# Patient Record
Sex: FEMALE | Race: WHITE | NOT HISPANIC OR LATINO | Employment: OTHER | URBAN - METROPOLITAN AREA
[De-identification: names, ages, dates, MRNs, and addresses within clinical notes are randomized per-mention and may not be internally consistent; named-entity substitution may affect disease eponyms.]

---

## 2017-02-15 ENCOUNTER — OFFICE VISIT (OUTPATIENT)
Dept: OBSTETRICS AND GYNECOLOGY | Facility: CLINIC | Age: 77
End: 2017-02-15

## 2017-02-15 VITALS
BODY MASS INDEX: 29.73 KG/M2 | HEIGHT: 66 IN | WEIGHT: 185 LBS | DIASTOLIC BLOOD PRESSURE: 80 MMHG | SYSTOLIC BLOOD PRESSURE: 140 MMHG

## 2017-02-15 DIAGNOSIS — Z01.419 GYNECOLOGIC EXAM NORMAL: Primary | ICD-10-CM

## 2017-02-15 PROCEDURE — G0101 CA SCREEN;PELVIC/BREAST EXAM: HCPCS | Performed by: NURSE PRACTITIONER

## 2017-02-15 RX ORDER — MONTELUKAST SODIUM 10 MG/1
10 TABLET ORAL
COMMUNITY
Start: 2016-06-08 | End: 2017-06-08

## 2017-02-15 RX ORDER — GABAPENTIN 100 MG/1
100 CAPSULE ORAL
COMMUNITY
Start: 2016-11-22 | End: 2017-11-22

## 2017-02-15 RX ORDER — METOPROLOL SUCCINATE 50 MG/1
50 TABLET, EXTENDED RELEASE ORAL
COMMUNITY
Start: 2016-10-25

## 2017-02-15 RX ORDER — MULTIVITAMIN
1 CAPSULE ORAL
COMMUNITY

## 2017-02-15 RX ORDER — CETIRIZINE HYDROCHLORIDE 5 MG/1
5 TABLET ORAL
COMMUNITY

## 2017-02-15 RX ORDER — OMEPRAZOLE 40 MG/1
40 CAPSULE, DELAYED RELEASE ORAL
COMMUNITY
Start: 2016-08-08 | End: 2017-08-08

## 2017-02-15 RX ORDER — TRIAMTERENE AND HYDROCHLOROTHIAZIDE 37.5; 25 MG/1; MG/1
1 TABLET ORAL
COMMUNITY
Start: 2016-08-08 | End: 2017-08-08

## 2017-02-15 NOTE — PATIENT INSTRUCTIONS
Pt. Counseled today on self breast examinations discussed. Colonoscopy recommended.  Bone Density Test recommended.  Calcium and Vitamin D requirements discussed.   Diet and exercise also counseled.

## 2017-02-15 NOTE — PROGRESS NOTES
Connie Mc is a 76 y.o. female.   Chief Complaint   Patient presents with   • Gynecologic Exam     annual      HPI:pt here for gyn exam, voices no c/o since last visit    The following portions of the patient's history were reviewed and updated as appropriate: allergies, current medications, past family history, past medical history, past social history, past surgical history and problem list.    Review of Systems  Review of Systems   Constitutional: Negative.  Negative for unexpected weight change.   Respiratory: Negative for chest tightness and shortness of breath.    Cardiovascular: Negative for chest pain and palpitations.   Gastrointestinal: Negative for abdominal pain and blood in stool.   Endocrine: Negative.    Genitourinary: Negative for dyspareunia, dysuria, frequency, hematuria, menstrual problem, pelvic pain, vaginal bleeding, vaginal discharge and vaginal pain.   Musculoskeletal: Negative for joint swelling.   Skin: Negative for color change, rash and wound.   Allergic/Immunologic: Negative.    Psychiatric/Behavioral: Negative.    All other systems reviewed and are negative.      Objective   Physical Exam   Constitutional: She is oriented to person, place, and time. She appears well-developed and well-nourished.   HENT:   Head: Normocephalic.   Neck: Normal range of motion.   Cardiovascular: Normal rate and regular rhythm.    Pulmonary/Chest: Effort normal and breath sounds normal. Right breast exhibits no mass and no nipple discharge. Left breast exhibits no mass and no nipple discharge. There is no breast swelling.   Breasts soft without palpable masses   Abdominal: Soft. Bowel sounds are normal.   Genitourinary: Vagina normal and uterus normal. There is no rash or lesion on the right labia. There is no rash or lesion on the left labia. Cervix exhibits no friability. Right adnexum displays no mass, no tenderness and no fullness. Left adnexum displays no mass, no tenderness and no fullness.    Genitourinary Comments: Ovaries  Within normal limits. Cervix  Within normal limits   Neurological: She is alert and oriented to person, place, and time.   Skin: Skin is warm and dry.   Psychiatric: She has a normal mood and affect. Her behavior is normal.   Vitals reviewed.      Assessment/Plan   Patient Instructions   Pt. Counseled today on self breast examinations discussed. Colonoscopy recommended.  Bone Density Test recommended.  Calcium and Vitamin D requirements discussed.   Diet and exercise also counseled.       Connie was seen today for gynecologic exam.    Diagnoses and all orders for this visit:    Gynecologic exam normal  -     Pap IG, Rfx HPV ASCU        Return in about 1 year (around 2/15/2018).

## 2017-02-17 LAB
CONV .: NORMAL
CYTOLOGIST CVX/VAG CYTO: NORMAL
CYTOLOGY CVX/VAG DOC THIN PREP: NORMAL
DX ICD CODE: NORMAL
HIV 1 & 2 AB SER-IMP: NORMAL
OTHER STN SPEC: NORMAL
PATH REPORT.FINAL DX SPEC: NORMAL
STAT OF ADQ CVX/VAG CYTO-IMP: NORMAL

## 2017-06-21 ENCOUNTER — APPOINTMENT (OUTPATIENT)
Dept: WOMENS IMAGING | Facility: HOSPITAL | Age: 77
End: 2017-06-21

## 2017-06-21 PROCEDURE — MDREVIEWSP: Performed by: RADIOLOGY

## 2017-06-21 PROCEDURE — G0202 SCR MAMMO BI INCL CAD: HCPCS | Performed by: RADIOLOGY

## 2017-06-21 PROCEDURE — 76641 ULTRASOUND BREAST COMPLETE: CPT | Performed by: RADIOLOGY

## 2017-06-21 PROCEDURE — 77063 BREAST TOMOSYNTHESIS BI: CPT | Performed by: RADIOLOGY

## 2017-07-10 DIAGNOSIS — N64.89 BREAST ASYMMETRY: ICD-10-CM

## 2018-01-03 ENCOUNTER — HOSPITAL ENCOUNTER (OUTPATIENT)
Dept: PHYSICAL THERAPY | Facility: HOSPITAL | Age: 78
Setting detail: RECURRING SERIES
Discharge: HOME | End: 2018-02-06
Attending: NURSE PRACTITIONER | Admitting: NURSE PRACTITIONER

## 2018-03-20 ENCOUNTER — ON CAMPUS - OUTPATIENT (AMBULATORY)
Dept: URBAN - METROPOLITAN AREA HOSPITAL 2 | Facility: HOSPITAL | Age: 78
End: 2018-03-20
Payer: COMMERCIAL

## 2018-03-20 ENCOUNTER — OFFICE (AMBULATORY)
Dept: URBAN - METROPOLITAN AREA PATHOLOGY 4 | Facility: PATHOLOGY | Age: 78
End: 2018-03-20
Payer: COMMERCIAL

## 2018-03-20 ENCOUNTER — HOSPITAL ENCOUNTER (OUTPATIENT)
Dept: OTHER | Facility: HOSPITAL | Age: 78
Setting detail: SPECIMEN
Discharge: HOME | End: 2018-03-20
Attending: INTERNAL MEDICINE | Admitting: INTERNAL MEDICINE

## 2018-03-20 VITALS
SYSTOLIC BLOOD PRESSURE: 129 MMHG | DIASTOLIC BLOOD PRESSURE: 74 MMHG | HEART RATE: 68 BPM | DIASTOLIC BLOOD PRESSURE: 76 MMHG | HEART RATE: 67 BPM | OXYGEN SATURATION: 97 % | OXYGEN SATURATION: 100 % | HEART RATE: 71 BPM | WEIGHT: 193 LBS | DIASTOLIC BLOOD PRESSURE: 65 MMHG | RESPIRATION RATE: 19 BRPM | DIASTOLIC BLOOD PRESSURE: 73 MMHG | HEART RATE: 73 BPM | DIASTOLIC BLOOD PRESSURE: 59 MMHG | SYSTOLIC BLOOD PRESSURE: 127 MMHG | SYSTOLIC BLOOD PRESSURE: 146 MMHG | HEART RATE: 65 BPM | DIASTOLIC BLOOD PRESSURE: 66 MMHG | HEART RATE: 66 BPM | SYSTOLIC BLOOD PRESSURE: 137 MMHG | SYSTOLIC BLOOD PRESSURE: 112 MMHG | TEMPERATURE: 98 F | DIASTOLIC BLOOD PRESSURE: 62 MMHG | HEART RATE: 64 BPM | SYSTOLIC BLOOD PRESSURE: 154 MMHG | DIASTOLIC BLOOD PRESSURE: 79 MMHG | SYSTOLIC BLOOD PRESSURE: 122 MMHG | DIASTOLIC BLOOD PRESSURE: 75 MMHG | SYSTOLIC BLOOD PRESSURE: 119 MMHG | HEART RATE: 57 BPM | RESPIRATION RATE: 16 BRPM | DIASTOLIC BLOOD PRESSURE: 46 MMHG | RESPIRATION RATE: 18 BRPM | HEIGHT: 66 IN | OXYGEN SATURATION: 99 %

## 2018-03-20 DIAGNOSIS — D12.3 BENIGN NEOPLASM OF TRANSVERSE COLON: ICD-10-CM

## 2018-03-20 DIAGNOSIS — D12.2 BENIGN NEOPLASM OF ASCENDING COLON: ICD-10-CM

## 2018-03-20 DIAGNOSIS — Z86.010 PERSONAL HISTORY OF COLONIC POLYPS: ICD-10-CM

## 2018-03-20 DIAGNOSIS — K57.30 DIVERTICULOSIS OF LARGE INTESTINE WITHOUT PERFORATION OR ABS: ICD-10-CM

## 2018-03-20 DIAGNOSIS — K22.70 BARRETT'S ESOPHAGUS WITHOUT DYSPLASIA: ICD-10-CM

## 2018-03-20 DIAGNOSIS — D12.5 BENIGN NEOPLASM OF SIGMOID COLON: ICD-10-CM

## 2018-03-20 DIAGNOSIS — K63.5 POLYP OF COLON: ICD-10-CM

## 2018-03-20 DIAGNOSIS — D12.0 BENIGN NEOPLASM OF CECUM: ICD-10-CM

## 2018-03-20 DIAGNOSIS — K21.0 GASTRO-ESOPHAGEAL REFLUX DISEASE WITH ESOPHAGITIS: ICD-10-CM

## 2018-03-20 LAB
GI HISTOLOGY: A. UNSPECIFIED: (no result)
GI HISTOLOGY: B. UNSPECIFIED: (no result)
GI HISTOLOGY: C. UNSPECIFIED: (no result)
GI HISTOLOGY: D. UNSPECIFIED: (no result)
GI HISTOLOGY: E. UNSPECIFIED: (no result)
GI HISTOLOGY: PDF REPORT: (no result)

## 2018-03-20 PROCEDURE — 43239 EGD BIOPSY SINGLE/MULTIPLE: CPT | Mod: 59 | Performed by: INTERNAL MEDICINE

## 2018-03-20 PROCEDURE — 45385 COLONOSCOPY W/LESION REMOVAL: CPT | Mod: PT | Performed by: INTERNAL MEDICINE

## 2018-03-20 PROCEDURE — 88305 TISSUE EXAM BY PATHOLOGIST: CPT | Mod: 26 | Performed by: INTERNAL MEDICINE

## 2018-03-20 RX ADMIN — PROPOFOL: 10 INJECTION, EMULSION INTRAVENOUS at 10:08

## 2018-06-26 ENCOUNTER — APPOINTMENT (OUTPATIENT)
Dept: WOMENS IMAGING | Facility: HOSPITAL | Age: 78
End: 2018-06-26

## 2018-06-26 PROCEDURE — MDREVIEWSP: Performed by: RADIOLOGY

## 2018-06-26 PROCEDURE — 77067 SCR MAMMO BI INCL CAD: CPT | Performed by: RADIOLOGY

## 2018-06-26 PROCEDURE — 77063 BREAST TOMOSYNTHESIS BI: CPT | Performed by: RADIOLOGY

## 2019-06-17 ENCOUNTER — TRANSCRIBE ORDERS (OUTPATIENT)
Dept: CARDIOLOGY | Facility: HOSPITAL | Age: 79
End: 2019-06-17

## 2019-06-17 DIAGNOSIS — I49.3 PVC'S (PREMATURE VENTRICULAR CONTRACTIONS): ICD-10-CM

## 2019-06-17 DIAGNOSIS — R00.2 PALPITATIONS: Primary | ICD-10-CM

## 2019-06-17 DIAGNOSIS — I10 ESSENTIAL HYPERTENSION: ICD-10-CM

## 2019-06-18 ENCOUNTER — HOSPITAL ENCOUNTER (OUTPATIENT)
Dept: CARDIOLOGY | Facility: HOSPITAL | Age: 79
Discharge: HOME | End: 2019-06-18

## 2019-06-18 ENCOUNTER — TRANSCRIBE ORDERS (OUTPATIENT)
Dept: PHYSICAL THERAPY | Facility: CLINIC | Age: 79
End: 2019-06-18

## 2019-06-18 ENCOUNTER — HOSPITAL ENCOUNTER (OUTPATIENT)
Dept: CARDIOLOGY | Facility: HOSPITAL | Age: 79
Discharge: HOME | End: 2019-06-18
Admitting: NURSE PRACTITIONER

## 2019-06-18 DIAGNOSIS — R00.2 PALPITATIONS: ICD-10-CM

## 2019-06-18 DIAGNOSIS — I10 ESSENTIAL HYPERTENSION: ICD-10-CM

## 2019-06-18 DIAGNOSIS — R42 DIZZINESS AND GIDDINESS: Primary | ICD-10-CM

## 2019-06-18 DIAGNOSIS — I49.3 PVC'S (PREMATURE VENTRICULAR CONTRACTIONS): ICD-10-CM

## 2019-06-18 PROCEDURE — 93306 TTE W/DOPPLER COMPLETE: CPT

## 2019-06-18 PROCEDURE — 93225 XTRNL ECG REC<48 HRS REC: CPT

## 2019-06-19 VITALS
SYSTOLIC BLOOD PRESSURE: 113 MMHG | HEIGHT: 66 IN | WEIGHT: 176 LBS | BODY MASS INDEX: 28.28 KG/M2 | DIASTOLIC BLOOD PRESSURE: 62 MMHG

## 2019-06-25 PROCEDURE — 93227 XTRNL ECG REC<48 HR R&I: CPT | Performed by: INTERNAL MEDICINE

## 2019-06-27 LAB
BH CV ECHO MEAS - ACS: 1.9 CM
BH CV ECHO MEAS - AO MAX PG (FULL): 3.3 MMHG
BH CV ECHO MEAS - AO MAX PG: 6.9 MMHG
BH CV ECHO MEAS - AO MEAN PG (FULL): 1.5 MMHG
BH CV ECHO MEAS - AO MEAN PG: 3.5 MMHG
BH CV ECHO MEAS - AO ROOT AREA (BSA CORRECTED): 1.7
BH CV ECHO MEAS - AO ROOT AREA: 7.9 CM^2
BH CV ECHO MEAS - AO ROOT DIAM: 3.2 CM
BH CV ECHO MEAS - AO V2 MAX: 131.6 CM/SEC
BH CV ECHO MEAS - AO V2 MEAN: 89.9 CM/SEC
BH CV ECHO MEAS - AO V2 VTI: 31.3 CM
BH CV ECHO MEAS - ASC AORTA: 3.6 CM
BH CV ECHO MEAS - AVA(I,A): 2.6 CM^2
BH CV ECHO MEAS - AVA(I,D): 2.6 CM^2
BH CV ECHO MEAS - AVA(V,A): 2.3 CM^2
BH CV ECHO MEAS - AVA(V,D): 2.3 CM^2
BH CV ECHO MEAS - BSA(HAYCOCK): 1.9 M^2
BH CV ECHO MEAS - BSA: 1.9 M^2
BH CV ECHO MEAS - BZI_BMI: 28.4 KILOGRAMS/M^2
BH CV ECHO MEAS - BZI_METRIC_HEIGHT: 167.6 CM
BH CV ECHO MEAS - BZI_METRIC_WEIGHT: 79.8 KG
BH CV ECHO MEAS - EDV(CUBED): 107.7 ML
BH CV ECHO MEAS - EDV(MOD-SP4): 77.2 ML
BH CV ECHO MEAS - EDV(TEICH): 105.3 ML
BH CV ECHO MEAS - EF(CUBED): 78.2 %
BH CV ECHO MEAS - EF(MOD-SP4): 60.5 %
BH CV ECHO MEAS - EF(TEICH): 70.3 %
BH CV ECHO MEAS - ESV(CUBED): 23.5 ML
BH CV ECHO MEAS - ESV(MOD-SP4): 30.5 ML
BH CV ECHO MEAS - ESV(TEICH): 31.2 ML
BH CV ECHO MEAS - FS: 39.8 %
BH CV ECHO MEAS - IVS/LVPW: 1.2
BH CV ECHO MEAS - IVSD: 1 CM
BH CV ECHO MEAS - LA DIMENSION: 4.4 CM
BH CV ECHO MEAS - LA/AO: 1.4
BH CV ECHO MEAS - LV DIASTOLIC VOL/BSA (35-75): 40.7 ML/M^2
BH CV ECHO MEAS - LV IVRT: 0.07 SEC
BH CV ECHO MEAS - LV MASS(C)D: 155.8 GRAMS
BH CV ECHO MEAS - LV MASS(C)DI: 82.2 GRAMS/M^2
BH CV ECHO MEAS - LV MAX PG: 3.7 MMHG
BH CV ECHO MEAS - LV MEAN PG: 2.1 MMHG
BH CV ECHO MEAS - LV SYSTOLIC VOL/BSA (12-30): 16.1 ML/M^2
BH CV ECHO MEAS - LV V1 MAX: 95.6 CM/SEC
BH CV ECHO MEAS - LV V1 MEAN: 68.5 CM/SEC
BH CV ECHO MEAS - LV V1 VTI: 25 CM
BH CV ECHO MEAS - LVIDD: 4.8 CM
BH CV ECHO MEAS - LVIDS: 2.9 CM
BH CV ECHO MEAS - LVOT AREA: 3.2 CM^2
BH CV ECHO MEAS - LVOT DIAM: 2 CM
BH CV ECHO MEAS - LVPWD: 0.86 CM
BH CV ECHO MEAS - MV A MAX VEL: 82.4 CM/SEC
BH CV ECHO MEAS - MV DEC SLOPE: 424.7 CM/SEC^2
BH CV ECHO MEAS - MV DEC TIME: 0.2 SEC
BH CV ECHO MEAS - MV E MAX VEL: 86.3 CM/SEC
BH CV ECHO MEAS - MV E/A: 1
BH CV ECHO MEAS - PA ACC TIME: 0.18 SEC
BH CV ECHO MEAS - PA MAX PG (FULL): 0.42 MMHG
BH CV ECHO MEAS - PA MAX PG: 2.5 MMHG
BH CV ECHO MEAS - PA MEAN PG (FULL): 0.41 MMHG
BH CV ECHO MEAS - PA MEAN PG: 1.7 MMHG
BH CV ECHO MEAS - PA PR(ACCEL): -3.3 MMHG
BH CV ECHO MEAS - PA V2 MAX: 78.8 CM/SEC
BH CV ECHO MEAS - PA V2 MEAN: 63.4 CM/SEC
BH CV ECHO MEAS - PA V2 VTI: 21.7 CM
BH CV ECHO MEAS - PULM A REVS DUR: 0.14 SEC
BH CV ECHO MEAS - PULM A REVS VEL: 25.1 CM/SEC
BH CV ECHO MEAS - PULM DIAS VEL: 40 CM/SEC
BH CV ECHO MEAS - PULM S/D: 1.6
BH CV ECHO MEAS - PULM SYS VEL: 63.6 CM/SEC
BH CV ECHO MEAS - RAP SYSTOLE: 8 MMHG
BH CV ECHO MEAS - RV MAX PG: 2.1 MMHG
BH CV ECHO MEAS - RV MEAN PG: 1.3 MMHG
BH CV ECHO MEAS - RV V1 MAX: 71.9 CM/SEC
BH CV ECHO MEAS - RV V1 MEAN: 54.3 CM/SEC
BH CV ECHO MEAS - RV V1 VTI: 20.5 CM
BH CV ECHO MEAS - RVSP: 31.5 MMHG
BH CV ECHO MEAS - SI(AO): 130.3 ML/M^2
BH CV ECHO MEAS - SI(CUBED): 44.4 ML/M^2
BH CV ECHO MEAS - SI(LVOT): 42.8 ML/M^2
BH CV ECHO MEAS - SI(MOD-SP4): 24.7 ML/M^2
BH CV ECHO MEAS - SI(TEICH): 39.1 ML/M^2
BH CV ECHO MEAS - SV(AO): 246.9 ML
BH CV ECHO MEAS - SV(CUBED): 84.2 ML
BH CV ECHO MEAS - SV(LVOT): 81 ML
BH CV ECHO MEAS - SV(MOD-SP4): 46.7 ML
BH CV ECHO MEAS - SV(TEICH): 74.1 ML
BH CV ECHO MEAS - TR MAX VEL: 241.7 CM/SEC
MAXIMAL PREDICTED HEART RATE: 140 BPM
STRESS TARGET HR: 119 BPM

## 2019-06-27 PROCEDURE — 93306 TTE W/DOPPLER COMPLETE: CPT | Performed by: INTERNAL MEDICINE

## 2019-07-02 ENCOUNTER — APPOINTMENT (OUTPATIENT)
Dept: WOMENS IMAGING | Facility: HOSPITAL | Age: 79
End: 2019-07-02

## 2019-07-02 PROCEDURE — G0279 TOMOSYNTHESIS, MAMMO: HCPCS | Performed by: RADIOLOGY

## 2019-07-02 PROCEDURE — 77066 DX MAMMO INCL CAD BI: CPT | Performed by: RADIOLOGY

## 2019-07-02 PROCEDURE — MDREVIEWSP: Performed by: RADIOLOGY

## 2019-07-02 PROCEDURE — 76641 ULTRASOUND BREAST COMPLETE: CPT | Performed by: RADIOLOGY

## 2019-09-04 ENCOUNTER — TREATMENT (OUTPATIENT)
Dept: PHYSICAL THERAPY | Facility: CLINIC | Age: 79
End: 2019-09-04

## 2019-09-04 DIAGNOSIS — H81.10 BPPV (BENIGN PAROXYSMAL POSITIONAL VERTIGO), UNSPECIFIED LATERALITY: Primary | ICD-10-CM

## 2019-09-04 PROCEDURE — 97112 NEUROMUSCULAR REEDUCATION: CPT | Performed by: PHYSICAL THERAPIST

## 2019-09-04 PROCEDURE — 95992 CANALITH REPOSITIONING PROC: CPT | Performed by: PHYSICAL THERAPIST

## 2019-09-04 PROCEDURE — 97161 PT EVAL LOW COMPLEX 20 MIN: CPT | Performed by: PHYSICAL THERAPIST

## 2019-09-04 NOTE — PROGRESS NOTES
"Physical Therapy Initial Evaluation and Plan of Care    Patient: Connie Mc   : 1940  Diagnosis/ICD-10 Code:  BPPV (benign paroxysmal positional vertigo), unspecified laterality [H81.10]  Referring practitioner: No ref. provider found  Date of Initial Visit: 2019  Today's Date: 2019  Patient seen for 1 sessions           Subjective Questionnaire: DHI:       Subjective   Mrs. Mc states that the vertigo started last Thursday when turning in bed. This is an extension of a vertigo evaluation and plan of care completed on 19.   She states that the symptoms have progressively worsened regarding frequency and intensity over the last day.  Denies any emesis, headache, or auditory changes.   Exacerbating factors:  Rolling in bed, supine<>sit and bending forward.  Relieving factors:  Closing eyes and being still  Concurrent complaints:  Feeling \"funny\" and poor concentration.     Objective   Vertebral Artery Screen:  Negative  Sharp-Vlad Test:  Negative  Huong-Hallpike Rt: latency approx 35 sec;  Duration = 53 sec; Rebound = 33 sec.  VOR,  Thrust,  Smooth pursuit, gaze-evoked nystagmus and cervical ROM tests:  Negative.     Assessment & Plan     Assessment  Impairments: abnormal coordination and impaired balance  Assessment details: Mrs. Mc has returned to PT due to another onset of vertigo.  She was seen on 19 with such c/os; pnt at that time required only 1 session.  Today, a reassessment was completed with evidence suggesting a right canalithiesis BPPV.   Connie would benefit from outpatient PT in order to address and alleviate the symptoms associated with this occurrence of BPPV.  The patient's condition and/or services required are at a level of complexity that necessitates the skill or supervision of a PT.    Prognosis: good  Functional Limitations: moving in bed  Goals  Plan Goals: ST visits      1)  pnt will tolerate position changes w/o c/os of vertigo in order to " improve safety x's 50% of the time.    LTG:  DC      1)  Pnt to return to pre-onset level      2)  Improve balance     Plan  Therapy options: will be seen for skilled physical therapy services  Planned therapy interventions: balance/weight-bearing training, home exercise program, manual therapy and neuromuscular re-education  Other planned therapy interventions: canalith reposition maneuvers  Duration in visits: 12  Treatment plan discussed with: patient  Plan details: Test TUG and Sullivan balance.         Timed:         Manual Therapy:         mins  97963;     Therapeutic Exercise:         mins  03838;     Neuromuscular Bridget:  10      mins  21308;    Therapeutic Activity:          mins  85846;     Gait Training:           mins  61504;     Ultrasound:          mins  98984;    Ionto                                   mins   16622  Self Care                            mins   35475  Canalith Repos    19     mins 68322      Un-Timed:  Electrical Stimulation:         mins  49742 ( );  Dry Needling          mins self-pay  Traction          mins 16445  Low Eval    22      Mins  26922  Mod Eval         Mins  28430  High Eval                            Mins  54092  Re-Eval                               mins  73451        Timed Treatment:   29   mins   Total Treatment:     51   mins    PT SIGNATURE: Carmita Berry, BLAYNE   DATE TREATMENT INITIATED: 9/4/2019    90 Day Recertification  Certification Period: 12/3/2019  I certify that the therapy services are furnished while this patient is under my care.  The services outlined above are required by this patient, and will be reviewed every 90 days.     PHYSICIAN:       DATE:     Please sign and return via fax to  .. Thank you, [facility].

## 2019-09-05 ENCOUNTER — OFFICE VISIT (OUTPATIENT)
Dept: PHYSICAL THERAPY | Facility: CLINIC | Age: 79
End: 2019-09-05

## 2019-09-05 ENCOUNTER — TELEPHONE (OUTPATIENT)
Dept: ENDOCRINOLOGY | Facility: CLINIC | Age: 79
End: 2019-09-05

## 2019-09-05 DIAGNOSIS — H81.10 BPPV (BENIGN PAROXYSMAL POSITIONAL VERTIGO), UNSPECIFIED LATERALITY: Primary | ICD-10-CM

## 2019-09-05 PROCEDURE — 97112 NEUROMUSCULAR REEDUCATION: CPT | Performed by: PHYSICAL THERAPIST

## 2019-09-05 PROCEDURE — 97140 MANUAL THERAPY 1/> REGIONS: CPT | Performed by: PHYSICAL THERAPIST

## 2019-09-05 NOTE — TELEPHONE ENCOUNTER
PATIENT CALLED AND LM  FOR ME TO SEE  IF WE RECEIVED HER REFERRAL AND WE DID. I CALLED THE PATIENT BACK AND LM FOR THE PATIENT TO CALL ME BACK AND SCHEDULE AN NP REFERRAL AT  THE [facility].

## 2019-09-05 NOTE — PROGRESS NOTES
"Physical Therapy Daily Progress Note    VISIT#: 2    Subjective   Connie Mc reports that she was really nauseated x's 1 hr post the 1st session.  After such time, she was symptom free.   No \"occurrences\" but \"still don't feel right\"      Objective   Tug score:  12 sec  No symptoms with initial positional test for R [place]-Hallpike.   (+) left [place] and right horizontal canals.  Significant nystagmus noted today.  Initial reaction time was sudden and 1'52\" L Huong-Hallpike; with almost the same for rebound time.   R horizontal canal was immediate with reaction time of 33 sec and less  10 seconds for rebound.  No nausea noted today.   VOR exercises    See Exercise, Manual, and Modality Logs for complete treatment.     Patient Education: continue with positioning for sleep at home as previously discussed.     Assessment/Plan  Pnt still experiencing significant vertigo today; however with L [place]-Hallpike and roll tests.     Progress per Plan of Care            Timed:         Manual Therapy:         mins  34822;     Therapeutic Exercise:         mins  02191;     Neuromuscular Bridget:    10    mins  42414;    Therapeutic Activity:          mins  04930;     Gait Training:           mins  92982;     Ultrasound:          mins  96976;    Ionto                                   mins   18548  Self Care                            mins   01205  Canalith Repos               27    mins  27187    Un-Timed:  Electrical Stimulation:         mins  38511 ( );  Dry Needling          mins self-pay  Traction          mins 29058  Low Eval          Mins  32608  Mod Eval          Mins  92668  High Eval                            Mins  44406  Re-Eval                               mins  38084    Timed Treatment:   37   mins   Total Treatment:     46   mins    Carmita Berry PT    Physical Therapist  "

## 2019-09-06 ENCOUNTER — OFFICE VISIT (OUTPATIENT)
Dept: PHYSICAL THERAPY | Facility: CLINIC | Age: 79
End: 2019-09-06

## 2019-09-06 DIAGNOSIS — H81.10 BPPV (BENIGN PAROXYSMAL POSITIONAL VERTIGO), UNSPECIFIED LATERALITY: Primary | ICD-10-CM

## 2019-09-06 PROCEDURE — 97535 SELF CARE MNGMENT TRAINING: CPT | Performed by: PHYSICAL THERAPIST

## 2019-09-06 PROCEDURE — 95992 CANALITH REPOSITIONING PROC: CPT | Performed by: PHYSICAL THERAPIST

## 2019-09-06 NOTE — PROGRESS NOTES
"Physical Therapy Daily Progress Note    VISIT#: 3    Subjective   Connie Mc reports she has not had any further episodes since therapy session.  However, still \"feel a little off\"      Objective   (-) roll test today.   (+) L Huong-Hallpike with a 33 sec occurrence, 42 sec rebound x's 1st maneuver.  Second maneuver was negative.   See Exercise, Manual, and Modality Logs for complete treatment.     Patient Education:  Positioning reviewed     Assessment/Plan  Pnt with much less symptoms today and symptom free at end of session.       Anticipate DC next Visit            Timed:         Manual Therapy:         mins  49252;     Therapeutic Exercise:         mins  64393;     Neuromuscular Bridget:        mins  31476;    Therapeutic Activity:          mins  80929;     Gait Training:           mins  96858;     Ultrasound:          mins  23889;    Ionto                                   mins   61258  Self Care                     10       mins   82385  Canalith Repos               25    mins  82426    Un-Timed:  Electrical Stimulation:         mins  21127 ( );  Dry Needling          mins self-pay  Traction          mins 46049  Low Eval          Mins  05552  Mod Eval          Mins  46861  High Eval                            Mins  39318  Re-Eval                               mins  46818    Timed Treatment:   35   mins   Total Treatment:     42   mins    Carmita Berry, PT    Physical Therapist  "

## 2019-09-12 ENCOUNTER — OFFICE VISIT (OUTPATIENT)
Dept: PHYSICAL THERAPY | Facility: CLINIC | Age: 79
End: 2019-09-12

## 2019-09-12 DIAGNOSIS — H81.10 BPPV (BENIGN PAROXYSMAL POSITIONAL VERTIGO), UNSPECIFIED LATERALITY: Primary | ICD-10-CM

## 2019-09-12 PROCEDURE — 97535 SELF CARE MNGMENT TRAINING: CPT | Performed by: PHYSICAL THERAPIST

## 2019-09-12 PROCEDURE — 95992 CANALITH REPOSITIONING PROC: CPT | Performed by: PHYSICAL THERAPIST

## 2019-09-12 NOTE — PROGRESS NOTES
Physical Therapy Daily Progress Note    VISIT#: 4    Subjective   Connie Mc reports: Was doing ok til yesterday, this morning was very dizzy as she laid her head back on her bed.       Objective     See Exercise, Manual, and Modality Logs for complete treatment.   Epley x3 (R), rest between     Patient Education: keep head elevated when sleeping for 72 hours, no jerking movements of head     Assessment/Plan  Pt with continued nystagmus during Epley maneuvers today, educated on anterior/posterior/horizontal canals and relationship with each other along with calcifications. Pt with good understanding. Will perform roll test for horizontal canal tomorrow if pt's symptoms are still present.     Progress per Plan of Care and Progress strengthening /stabilization /functional activity            Timed:         Manual Therapy:         mins  77711;     Therapeutic Exercise:         mins  32043;     Neuromuscular Bridget:        mins  04303;    Therapeutic Activity:          mins  09976;     Gait Training:           mins  02753;     Ultrasound:          mins  93089;    Ionto                                   mins   29370  Self Care                       10     mins   95444  Canalith Repos               16    mins  93588    Un-Timed:  Electrical Stimulation:         mins  56782 ( );  Traction          mins 67194  Low Eval          Mins  06428  Mod Eval          Mins  57961  High Eval                            Mins  82992  Re-Eval                               mins  99141    Timed Treatment:   26   mins   Total Treatment:     32   mins    Yeimy Green, PT    Physical Therapist

## 2019-09-13 ENCOUNTER — OFFICE VISIT (OUTPATIENT)
Dept: PHYSICAL THERAPY | Facility: CLINIC | Age: 79
End: 2019-09-13

## 2019-09-13 DIAGNOSIS — H81.10 BPPV (BENIGN PAROXYSMAL POSITIONAL VERTIGO), UNSPECIFIED LATERALITY: Primary | ICD-10-CM

## 2019-09-13 PROCEDURE — 95992 CANALITH REPOSITIONING PROC: CPT | Performed by: PHYSICAL THERAPIST

## 2019-09-13 NOTE — PROGRESS NOTES
Physical Therapy Daily Progress Note    VISIT#: 5    Subjective   Connie Mc reports: Had a small episode as she was riding home after yesterday's session, today is not too bad, still feels unsteady on her feet.       Objective     See Exercise, Manual, and Modality Logs for complete treatment.   Roll test x1 for horizontal canal  Epley (R) x3     Assessment/Plan  Pt with continued dizziness and nustagmus, attempted roll test to clear horizontal canal, increased sx's in AP canal following. Performed Epley x3, no nystagmus in last 2 positions on last attempt.     Progress per Plan of Care            Timed:         Manual Therapy:         mins  78743;     Therapeutic Exercise:         mins  08618;     Neuromuscular Bridget:        mins  79986;    Therapeutic Activity:          mins  06846;     Gait Training:           mins  32763;     Ultrasound:          mins  00363;    Ionto                                   mins   37353  Self Care                            mins   58720  Canalith Repos               20    mins  50413    Un-Timed:  Electrical Stimulation:         mins  64974 ( );  Traction          mins 63243  Low Eval          Mins  63377  Mod Eval          Mins  96691  High Eval                            Mins  91717  Re-Eval                               mins  32668    Timed Treatment:   20   mins   Total Treatment:     25   mins    Yeimy Green, PT    Physical Therapist

## 2019-12-17 PROBLEM — F41.9 ANXIETY: Status: ACTIVE | Noted: 2018-08-15

## 2019-12-17 PROBLEM — M54.10 BACK PAIN WITH LEFT-SIDED RADICULOPATHY: Status: ACTIVE | Noted: 2018-03-13

## 2019-12-17 PROBLEM — R73.03 PREDIABETES: Status: ACTIVE | Noted: 2018-07-16

## 2019-12-17 RX ORDER — CLOBETASOL PROPIONATE 0.5 MG/G
CREAM TOPICAL
COMMUNITY
Start: 2018-10-31

## 2019-12-17 RX ORDER — AMLODIPINE BESYLATE 5 MG/1
5 TABLET ORAL DAILY
COMMUNITY
Start: 2019-01-14

## 2019-12-17 RX ORDER — TIZANIDINE 2 MG/1
2 TABLET ORAL
COMMUNITY
Start: 2019-01-14 | End: 2020-01-14

## 2019-12-17 RX ORDER — FLUTICASONE PROPIONATE 50 MCG
1 SPRAY, SUSPENSION (ML) NASAL DAILY
COMMUNITY

## 2019-12-17 RX ORDER — ESCITALOPRAM OXALATE 10 MG/1
10 TABLET ORAL DAILY
COMMUNITY
Start: 2019-01-14

## 2019-12-17 RX ORDER — LOSARTAN POTASSIUM 50 MG/1
50 TABLET ORAL DAILY
COMMUNITY
Start: 2019-01-14

## 2019-12-17 RX ORDER — TRIAMTERENE AND HYDROCHLOROTHIAZIDE 37.5; 25 MG/1; MG/1
1 TABLET ORAL DAILY
COMMUNITY
Start: 2019-01-14

## 2019-12-17 RX ORDER — OMEPRAZOLE 40 MG/1
40 CAPSULE, DELAYED RELEASE ORAL DAILY
COMMUNITY
Start: 2019-01-14

## 2019-12-17 RX ORDER — CELECOXIB 200 MG/1
200 CAPSULE ORAL DAILY
COMMUNITY
Start: 2019-06-28

## 2019-12-17 RX ORDER — ROSUVASTATIN CALCIUM 10 MG/1
10 TABLET, COATED ORAL DAILY
COMMUNITY
Start: 2019-01-14 | End: 2020-01-14

## 2020-01-07 ENCOUNTER — OFFICE VISIT (OUTPATIENT)
Dept: ENDOCRINOLOGY | Facility: CLINIC | Age: 80
End: 2020-01-07

## 2020-01-07 VITALS
SYSTOLIC BLOOD PRESSURE: 115 MMHG | DIASTOLIC BLOOD PRESSURE: 65 MMHG | BODY MASS INDEX: 30.05 KG/M2 | HEIGHT: 66 IN | OXYGEN SATURATION: 99 % | HEART RATE: 77 BPM | WEIGHT: 187 LBS

## 2020-01-07 DIAGNOSIS — E04.2 MULTIPLE THYROID NODULES: Primary | ICD-10-CM

## 2020-01-07 PROBLEM — F41.9 ANXIETY: Status: ACTIVE | Noted: 2018-08-15

## 2020-01-07 PROBLEM — R73.03 PREDIABETES: Status: ACTIVE | Noted: 2018-07-16

## 2020-01-07 PROCEDURE — 99204 OFFICE O/P NEW MOD 45 MIN: CPT | Performed by: INTERNAL MEDICINE

## 2020-01-07 RX ORDER — DOXYCYCLINE HYCLATE 100 MG
TABLET ORAL
COMMUNITY
Start: 2020-01-06

## 2020-01-07 RX ORDER — BENZONATATE 200 MG/1
CAPSULE ORAL
COMMUNITY
Start: 2020-01-06

## 2020-01-07 NOTE — PROGRESS NOTES
Endocrine Consult Outpatient  Referred by Odalis Meza. NP for thyroid nodule consult  Patient Care Team:  Shruthi Meza APRN as PCP - General  Shruthi Meza APRN as PCP - Family Medicine  Shruthi Meza APRN as Referring Physician (Nurse Practitioner)     Chief Complaint: Thyroid nodule    HPI: 79-year-old female with history of hypertension, hyperlipidemia, prediabetes was incidentally found to have thyroid nodules when a carotid ultrasound was done few years ago and since then it is been followed.  She is now referred here for evaluation and management.  Her initial ultrasound was done in 2013 which showed tiny hypoechoic lesions in the thyroid measuring up to 5 mm no suspicious nodules were identified.  He subsequently had a repeat ultrasound in 2016 which showed that she had a 6 mm right thyroid nodule and a 2 mm cyst in the midpole of left thyroid.  Subsequently she had a repeat ultrasound in January 2019 which showed a 6 mm node in the right side was no longer visualized and she had 2 separate tiny cysts about 2 mm on the left side.  There is no family history of thyroid cancer, no history of radiation exposure, she denies any trouble swallowing or choking or persistent change in the voice or hoarseness.  She is not taking any thyroid medications at this time.    Old records reviewed: I have reviewed the reports from her thyroid ultrasound from 2013, 2016 and 2019 and they are incorporated in the HPI above.    Past Medical History:   Diagnosis Date   • Wilcox's esophagus    • Diabetes mellitus (CMS/HCC)    • Hypertension    • Restless leg    • Thyroid nodule        Social History     Socioeconomic History   • Marital status:      Spouse name: Not on file   • Number of children: 2   • Years of education: Not on file   • Highest education level: Not on file   Tobacco Use   • Smoking status: Never Smoker   Substance and Sexual Activity   • Alcohol use: Yes     Comment: socially   • Drug use:  No   • Sexual activity: Never       Family History   Problem Relation Age of Onset   • Melanoma Father    • Hypertension Father    • Cancer Father         melanoma   • Breast cancer Mother    • Heart disease Mother    • Hypertension Mother    • Thyroid disease Maternal Aunt        Allergies   Allergen Reactions   • Codeine    • Erythromycin    • Penicillins        ROS:   Constitutional:  Denies fatigue, tiredness.    Eyes:  Denies change in visual acuity   HENT:  Denies nasal congestion or sore throat   Respiratory: denies cough, shortness of breath.   Cardiovascular:  denies chest pain, edema   GI:  Denies abdominal pain, nausea, vomiting, bloody stools or diarrhea   :  Denies dysuria   Musculoskeletal:  Denies back pain or joint pain   Integument:  Denies dry skin, rash   Neurologic:  Denies headache, focal weakness or sensory changes   Endocrine:  Denies polyuria or polydipsia   Psychiatric:  Denies depression or anxiety      Vitals:    01/07/20 0912   BP: 115/65   Pulse: 77   SpO2: 99%        Physical Exam:  GEN: NAD, conversant  EYES: EOMI, PERRL, no conjunctival erythema  NECK: no thyromegaly, full ROM   CV: RRR, no murmurs/rubs/gallops, no peripheral edema  LUNG: CTAB, no wheezes/rales/ronchi  SKIN: no rashes, no acanthosis  MSK: no deformities, full ROM of all extremities  NEURO: no tremors, DTR normal  PSYCH: AOX3, appropriate mood, affect normal      Results Review:     I reviewed the patient's new clinical results.    Lab Results   Component Value Date    BUN 28 (H) 01/02/2020    CREATININE 0.9 01/02/2020    BCR 33.0 (H) 01/02/2020    K 4.5 01/02/2020    CO2 29 01/02/2020    CALCIUM 9.6 01/02/2020    ALBUMIN 4.1 01/02/2020    LABIL2 1.3 01/02/2020    AST 21 01/02/2020    ALT 16 01/02/2020    TRIG 121 01/02/2020    HDL 52 01/02/2020    LDL 57 01/02/2020     Lab Results   Component Value Date    HGBA1C 6.2 (H) 01/02/2020    HGBA1C 5.9 (H) 08/05/2019    HGBA1C 6.0 (H) 02/06/2019     Lab Results   Component  Value Date    CREATININE 0.9 01/02/2020       Medication Review: Reviewed.       Current Outpatient Medications:   •  amLODIPine (NORVASC) 5 MG tablet, Take 5 mg by mouth Daily., Disp: , Rfl:   •  aspirin 81 MG tablet, Take 81 mg by mouth., Disp: , Rfl:   •  benzonatate (TESSALON) 200 MG capsule, , Disp: , Rfl:   •  Calcium Carb-Cholecalciferol 1000-800 MG-UNIT tablet, Take  by mouth., Disp: , Rfl:   •  celecoxib (CeleBREX) 200 MG capsule, Take 200 mg by mouth Daily. 1 tablet 4 times a weekly, Disp: , Rfl:   •  cetirizine (zyrTEC) 5 MG tablet, Take 5 mg by mouth., Disp: , Rfl:   •  clobetasol (TEMOVATE) 0.05 % cream, , Disp: , Rfl:   •  escitalopram (LEXAPRO) 10 MG tablet, Take 10 mg by mouth Daily., Disp: , Rfl:   •  fluticasone (FLONASE) 50 MCG/ACT nasal spray, 1 spray into the nostril(s) as directed by provider Daily., Disp: , Rfl:   •  losartan (COZAAR) 50 MG tablet, Take 50 mg by mouth Daily., Disp: , Rfl:   •  metFORMIN (GLUCOPHAGE) 500 MG tablet, Take 500 mg by mouth., Disp: , Rfl:   •  metoprolol succinate XL (TOPROL-XL) 50 MG 24 hr tablet, Take 50 mg by mouth., Disp: , Rfl:   •  Multiple Vitamin (MULTIVITAMIN) capsule, Take 1 capsule by mouth., Disp: , Rfl:   •  omeprazole (priLOSEC) 40 MG capsule, Take 40 mg by mouth Daily., Disp: , Rfl:   •  rosuvastatin (CRESTOR) 10 MG tablet, Take 10 mg by mouth Daily., Disp: , Rfl:   •  tiZANidine (ZANAFLEX) 2 MG tablet, Take 2 mg by mouth., Disp: , Rfl:   •  triamterene-hydrochlorothiazide (MAXZIDE-25) 37.5-25 MG per tablet, Take 1 tablet by mouth Daily., Disp: , Rfl:   •  doxycycline (VIBRAMYICN) 100 MG tablet, , Disp: , Rfl:     Assessment/Plan   Multiple thyroid nodules: She has 2 tiny cysts on the left side of thyroid, no risk factors for thyroid cancer.  No further intervention is needed.  She does not need routine thyroid ultrasound follow-ups at this time.  However if she develops any symptoms like feeling a nodule in the neck or any trouble swallowing or  choking then she will need further evaluation.  I also recommend that she takes a TSH to complete the work-up.  She will do this with her primary care physician.    I will see her as needed.              Alejandra Velazquez MD FACE.      Much of the above report is an electronic transcription/translation of the spoken language to printed text using Dragon Software. As such, the subtleties and finesse of the spoken language may permit erroneous, or at times, nonsensical words or phrases to be inadvertently transcribed; thus changes may be made at a later date to rectify these errors.

## 2020-04-20 ENCOUNTER — DOCUMENTATION (OUTPATIENT)
Dept: PHYSICAL THERAPY | Facility: CLINIC | Age: 80
End: 2020-04-20

## 2020-04-20 NOTE — PROGRESS NOTES
Discharge Summary  Discharge Summary from Physical Therapy Report      Dates  PT visit: 9/4/19 - /13/19  Number of Visits: 4     Discharge Status of Patient: See MD Note dated Subjective   Connie Mc reports: Had a small episode as she was riding home after yesterday's session, today is not too bad, still feels unsteady on her feet.         Objective      See Exercise, Manual, and Modality Logs for complete treatment.   Roll test x1 for horizontal canal  Epley (R) x3      Assessment/Plan  Pt with continued dizziness and nustagmus, attempted roll test to clear horizontal canal, increased sx's in AP canal following. Performed Epley x3, no nystagmus in last 2 positions on last attempt.     Goals: Partially Met    Discharge Plan: Continue with current home exercise program as instructed    Comments     Date of Discharge 10/30/19        Carmita Berry, PT  Physical Therapist

## 2020-10-13 ENCOUNTER — APPOINTMENT (OUTPATIENT)
Dept: WOMENS IMAGING | Facility: HOSPITAL | Age: 80
End: 2020-10-13

## 2020-10-13 PROCEDURE — 77067 SCR MAMMO BI INCL CAD: CPT | Performed by: RADIOLOGY

## 2020-10-13 PROCEDURE — 77063 BREAST TOMOSYNTHESIS BI: CPT | Performed by: RADIOLOGY

## 2020-10-27 ENCOUNTER — TRANSCRIBE ORDERS (OUTPATIENT)
Dept: PHYSICAL THERAPY | Facility: CLINIC | Age: 80
End: 2020-10-27

## 2020-10-27 DIAGNOSIS — R42 VERTIGO: Primary | ICD-10-CM

## 2020-11-03 ENCOUNTER — TREATMENT (OUTPATIENT)
Dept: PHYSICAL THERAPY | Facility: CLINIC | Age: 80
End: 2020-11-03

## 2020-11-03 DIAGNOSIS — H81.10 BPPV (BENIGN PAROXYSMAL POSITIONAL VERTIGO), UNSPECIFIED LATERALITY: ICD-10-CM

## 2020-11-03 DIAGNOSIS — R42 VERTIGO: Primary | ICD-10-CM

## 2020-11-03 PROCEDURE — 97161 PT EVAL LOW COMPLEX 20 MIN: CPT | Performed by: PHYSICAL THERAPIST

## 2020-11-03 PROCEDURE — 95992 CANALITH REPOSITIONING PROC: CPT | Performed by: PHYSICAL THERAPIST

## 2020-11-03 NOTE — PROGRESS NOTES
Physical Therapy Initial Evaluation and Plan of Care    Patient: Connie Mc   : 1940  Diagnosis/ICD-10 Code:  Vertigo [R42]  Referring practitioner: SONJA Mota  Date of Initial Visit: 11/3/2020  Today's Date: 11/3/2020  Patient seen for 1 sessions           Subjective Questionnaire: DHI: 16% limited      Subjective Evaluation    History of Present Illness  Mechanism of injury: Pt reports vertigo which has happened about 10x in 20 yrs. Pt states this bout has been going on since early summer, but she didn't want to come in due to Covid. Pt states it was only with going to bed at night with rolling to the L. Pt states then it started bothering her going to the R. Pt states with head extended in recliner it starts. Pt states this time it's milder, but it won't go away. Pt tried to perform a self Epley maneuver, but unsuccessful in resolving symptoms. Pt denies any emesis, headache, or auditory changes.     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 female who presents to physical therapy today for vertigo/BPPV. Upon initial evaluation, the patient demonstrates the following impairments: poor posture, limited cerv mobility, dizziness/nystagmus evoked with positional changes. Pt appeared initially with horizontal canal BPPV. After performing  Apiani & Casani maneuvers, Varney-Hallpikes were retested with (-) R & (+) L. Due to these impairments, the patient is unable to/limited with: rolling in bed R or L, semi reclining with head tilted back. The patient would benefit from skilled PT services to address functional limitations and impairments and to improve patient quality of life.      Barriers to therapy: arthritis could affect PT Rx/progress/outcomes if exacerbated limiting ability to position pt for maneuvers or for limitations in ability to perform balance testing or exs prn  Prognosis: good    Goals  Plan Goals: Goals  Plan Goals: ST visits      1)  pnt will tolerate position changes w/o c/os of vertigo in order to improve safety x's 50% of the time.    LTG:  DC      1)  Pnt to return to pre-onset level      2)  Improve balance     Plan  Therapy options: will be seen for skilled physical therapy services  Planned therapy interventions: manual therapy, gait training, neuromuscular re-education, postural training, balance/weight-bearing training, ADL retraining, strengthening, transfer training, therapeutic activities and home exercise program  Other planned therapy interventions: canlith repositioning  Duration in visits: 20  Treatment plan discussed with: patient        History # of Personal Factors and/or Comorbidities: MODERATE (1-2)  Examination of Body System(s): # of elements: LOW (1-2)  Clinical Presentation: EVOLVING  Clinical Decision Making: LOW       Timed:         Manual Therapy:         mins  76671;     Therapeutic Exercise:         mins  17008;     Neuromuscular Bridget:        mins  27949;    Therapeutic Activity:          mins  82613;     Gait Training:           mins  94423;     Ultrasound:          mins  76512;    Ionto                                   mins   66986  Self Care                            mins   44686  Canalith Repos   16      mins 98984      Un-Timed:  Electrical Stimulation:         mins  83400 ( );  Dry  Needling          mins self-pay  Traction          mins 51883  Low Eval    28      Mins  53915  Mod Eval          Mins  84660  High Eval                            Mins  18356  Re-Eval                               mins  78496        Timed Treatment:   16   mins   Total Treatment:     44   mins    PT SIGNATURE: Yakelin Andujar, PT   [place] Lic# [id number]  DATE TREATMENT INITIATED: 11/3/2020    Medicare Initial Certification  Certification Period: 2/1/2021  I certify that the therapy services are furnished while this patient is under my care.  The services outlined above are required by this patient, and will be reviewed every 90 days.     PHYSICIAN: Shruthi Meza, SONJA      DATE:     Please sign and return via fax to [phone]. Thank you, [facility].

## 2021-01-04 ENCOUNTER — DOCUMENTATION (OUTPATIENT)
Dept: PHYSICAL THERAPY | Facility: CLINIC | Age: 81
End: 2021-01-04

## 2021-06-07 ENCOUNTER — OFFICE (AMBULATORY)
Dept: URBAN - METROPOLITAN AREA PATHOLOGY 4 | Facility: PATHOLOGY | Age: 81
End: 2021-06-07
Payer: COMMERCIAL

## 2021-06-07 ENCOUNTER — ON CAMPUS - OUTPATIENT (AMBULATORY)
Dept: URBAN - METROPOLITAN AREA HOSPITAL 2 | Facility: HOSPITAL | Age: 81
End: 2021-06-07

## 2021-06-07 ENCOUNTER — OFFICE (AMBULATORY)
Dept: URBAN - METROPOLITAN AREA PATHOLOGY 4 | Facility: PATHOLOGY | Age: 81
End: 2021-06-07

## 2021-06-07 VITALS
DIASTOLIC BLOOD PRESSURE: 67 MMHG | HEART RATE: 70 BPM | OXYGEN SATURATION: 99 % | SYSTOLIC BLOOD PRESSURE: 123 MMHG | DIASTOLIC BLOOD PRESSURE: 60 MMHG | RESPIRATION RATE: 22 BRPM | DIASTOLIC BLOOD PRESSURE: 66 MMHG | SYSTOLIC BLOOD PRESSURE: 110 MMHG | HEART RATE: 62 BPM | SYSTOLIC BLOOD PRESSURE: 115 MMHG | SYSTOLIC BLOOD PRESSURE: 96 MMHG | WEIGHT: 195 LBS | HEART RATE: 57 BPM | DIASTOLIC BLOOD PRESSURE: 64 MMHG | DIASTOLIC BLOOD PRESSURE: 70 MMHG | HEART RATE: 58 BPM | SYSTOLIC BLOOD PRESSURE: 85 MMHG | TEMPERATURE: 96.8 F | RESPIRATION RATE: 14 BRPM | DIASTOLIC BLOOD PRESSURE: 52 MMHG | SYSTOLIC BLOOD PRESSURE: 118 MMHG | SYSTOLIC BLOOD PRESSURE: 122 MMHG | HEART RATE: 54 BPM | SYSTOLIC BLOOD PRESSURE: 127 MMHG | OXYGEN SATURATION: 96 % | SYSTOLIC BLOOD PRESSURE: 121 MMHG | DIASTOLIC BLOOD PRESSURE: 56 MMHG | HEART RATE: 64 BPM | SYSTOLIC BLOOD PRESSURE: 133 MMHG | HEIGHT: 66 IN | OXYGEN SATURATION: 97 % | DIASTOLIC BLOOD PRESSURE: 75 MMHG | OXYGEN SATURATION: 98 % | RESPIRATION RATE: 16 BRPM | OXYGEN SATURATION: 100 % | HEART RATE: 59 BPM

## 2021-06-07 DIAGNOSIS — K62.1 RECTAL POLYP: ICD-10-CM

## 2021-06-07 DIAGNOSIS — D12.0 BENIGN NEOPLASM OF CECUM: ICD-10-CM

## 2021-06-07 DIAGNOSIS — D12.2 BENIGN NEOPLASM OF ASCENDING COLON: ICD-10-CM

## 2021-06-07 DIAGNOSIS — D12.3 BENIGN NEOPLASM OF TRANSVERSE COLON: ICD-10-CM

## 2021-06-07 DIAGNOSIS — K57.30 DIVERTICULOSIS OF LARGE INTESTINE WITHOUT PERFORATION OR ABS: ICD-10-CM

## 2021-06-07 DIAGNOSIS — Z86.010 PERSONAL HISTORY OF COLONIC POLYPS: ICD-10-CM

## 2021-06-07 PROBLEM — K63.5 POLYP OF COLON: Status: ACTIVE | Noted: 2021-06-07

## 2021-06-07 PROBLEM — K63.89 OTHER SPECIFIED DISEASES OF INTESTINE: Status: ACTIVE | Noted: 2021-06-07

## 2021-06-07 LAB
GI HISTOLOGY: A. UNSPECIFIED: (no result)
GI HISTOLOGY: B. UNSPECIFIED: (no result)
GI HISTOLOGY: C. UNSPECIFIED: (no result)
GI HISTOLOGY: D. UNSPECIFIED: (no result)
GI HISTOLOGY: PDF REPORT: (no result)

## 2021-06-07 PROCEDURE — 45382 COLONOSCOPY W/CONTROL BLEED: CPT | Mod: 59,PT | Performed by: INTERNAL MEDICINE

## 2021-06-07 PROCEDURE — 45385 COLONOSCOPY W/LESION REMOVAL: CPT | Mod: PT | Performed by: INTERNAL MEDICINE

## 2021-06-07 PROCEDURE — 45382 COLONOSCOPY W/CONTROL BLEED: CPT | Mod: PT,59 | Performed by: INTERNAL MEDICINE

## 2021-06-07 PROCEDURE — 88305 TISSUE EXAM BY PATHOLOGIST: CPT | Mod: 26 | Performed by: INTERNAL MEDICINE

## 2022-04-25 ENCOUNTER — TRANSCRIBE ORDERS (OUTPATIENT)
Dept: PHYSICAL THERAPY | Facility: CLINIC | Age: 82
End: 2022-04-25

## 2022-04-25 DIAGNOSIS — R42 VERTIGO: Primary | ICD-10-CM

## 2022-04-26 ENCOUNTER — TREATMENT (OUTPATIENT)
Dept: PHYSICAL THERAPY | Facility: CLINIC | Age: 82
End: 2022-04-26

## 2022-04-26 DIAGNOSIS — H81.10 BPPV (BENIGN PAROXYSMAL POSITIONAL VERTIGO), UNSPECIFIED LATERALITY: ICD-10-CM

## 2022-04-26 DIAGNOSIS — R42 VERTIGO: Primary | ICD-10-CM

## 2022-04-26 PROCEDURE — 97161 PT EVAL LOW COMPLEX 20 MIN: CPT | Performed by: PHYSICAL THERAPIST

## 2022-04-26 PROCEDURE — 97112 NEUROMUSCULAR REEDUCATION: CPT | Performed by: PHYSICAL THERAPIST

## 2022-04-26 PROCEDURE — 95992 CANALITH REPOSITIONING PROC: CPT | Performed by: PHYSICAL THERAPIST

## 2022-04-26 NOTE — PROGRESS NOTES
Physical Therapy Initial Evaluation and Plan of Care    Patient: Connie Mc   : 1940  Diagnosis/ICD-10 Code:  Vertigo [R42]  Referring practitioner: SONJA Mota  Date of Initial Visit: 2022  Today's Date: 2022  Patient seen for 1 sessions           Subjective Questionnaire: DHI: 40% impairment       Subjective Evaluation    History of Present Illness  Mechanism of injury: Pt has had a long history of issues with vertigo. Has been treated successfully multiple times here before.     Has had this episode for about 3 weeks now. Also uses Meclizine in the past but has not tried it this time yet.     Limitations: turning over in bed, standing up from bed, turning head quickly     PMHx:  Mild kyphoscoliosis, arthritis    Quality of life: good    Pain  No pain reported    Treatments  Previous treatment: physical therapy  Patient Goals  Patient goals for therapy: improved balance, independence with ADLs/IADLs, return to sport/leisure activities and increased motion             Objective          Ambulation     Comments   VESTIBULAR:  Vertigo / hypofunction    VOMS:      Baseline symptoms- 2/10           Smooth pursuit - 0/10           Saccades vertical - 2/10           Saccades horizontal 2/10           Convergence (near point) 0/10           VOR - horizontal 0/10           VOR - vertical 0/10             [place] SOP:     Condition 1 - N     Condition 2 -S     Condition 3 -N     Condition 4 -N     Condition 5 -S     Condition 6 -S     Condition 7 -S     Vertebral AA test: negative Bilat     Nystagmus present with saccades during VOMS testing     Nystagmus present with SL hallpike test           Assessment & Plan     Assessment  Impairments: abnormal coordination, abnormal gait, activity intolerance, impaired balance, impaired physical strength, lacks appropriate home exercise program and safety issue  Functional Limitations: sleeping, moving in bed, stooping and unable to perform repetitive  tasks  Assessment details: Pt is an 81 yr/o female presenting with dizziness and vestibular impairment.   Per DHI she reports 40% impairment. She shows positive signs for vertigo and vertiginous causes of dizziness. Education regarding causes of vertigo, recurrent issues with vertigo, and anatomy of vestibular system. Pt had positive results with canalith repositioning maneuvers today, and experienced multiple bouts of nystagmus throughout tx.   Educated on results of evaluation, as well as initial HEP. Pt with good understanding. Recommend skilled OPPT to address above issues, pt in agreement.   Prognosis: good    Goals  Plan Goals: STG: to be met within 6 visits   1. Pt to be (I) with initial HEP  2. 50% decrease in dizziness and spinning sensations   3. Less than 3 days a week with dizziness upon sitting up     LTG: to be met by DC   1. Pt to be (I) with finalized HEP  2. Pt to report decreased impairment per DHI to less than 15%   3. Resume normal positioning and rolling in bed with no issues with dizziness   4. Return to normal childcare activities without fear of becoming dizzy     Plan  Therapy options: will be seen for skilled therapy services  Planned modality interventions: cryotherapy, thermotherapy (hydrocollator packs) and dry needling  Planned therapy interventions: manual therapy, neuromuscular re-education, spinal/joint mobilization, strengthening, therapeutic activities, home exercise program, gait training, functional ROM exercises, fine motor coordination training and balance/weight-bearing training  Other planned therapy interventions: canalith repositioning techniques and maneuvers  Frequency: 1x week  Duration in weeks: 13  Treatment plan discussed with: patient        History # of Personal Factors and/or Comorbidities: LOW (0)  Examination of Body System(s): # of elements: LOW (1-2)  Clinical Presentation: STABLE   Clinical Decision Making: LOW     Timed:         Manual Therapy:         mins   52787;     Therapeutic Exercise:         mins  49686;     Neuromuscular rBidget:    10    mins  79240;    Therapeutic Activity:          mins  83022;     Gait Training:           mins  79140;     Ultrasound:          mins  98993;    Ionto                                   mins   58715  Self Care                            mins   37764  Canalith Repos    15     mins 12079      Un-Timed:  Electrical Stimulation:         mins  52930 ( );  Traction          mins 29908  Dry Needle                 ______ mins DRYNDL  Low Eval     18     Mins  16741  Mod Eval          Mins  68863  High Eval                            Mins  18400  Re-Eval                               mins  83752        Timed Treatment:   25   mins   Total Treatment:     55   mins    PT SIGNATURE: Yeimy Green, PT   DATE TREATMENT INITIATED: 4/26/2022    Initial Certification  Certification Period: 4/26/2022 through 7/25/2022  I certify that the therapy services are furnished while this patient is under my care.  The services outlined above are required by this patient, and will be reviewed every 90 days.     PHYSICIAN: Shruthi Meza APRN      DATE:     Please sign and return via fax to [phone]. Thank you, [facility].

## 2022-05-10 ENCOUNTER — TREATMENT (OUTPATIENT)
Dept: PHYSICAL THERAPY | Facility: CLINIC | Age: 82
End: 2022-05-10

## 2022-05-10 DIAGNOSIS — H81.10 BPPV (BENIGN PAROXYSMAL POSITIONAL VERTIGO), UNSPECIFIED LATERALITY: ICD-10-CM

## 2022-05-10 DIAGNOSIS — R42 VERTIGO: Primary | ICD-10-CM

## 2022-05-10 PROCEDURE — 95992 CANALITH REPOSITIONING PROC: CPT | Performed by: PHYSICAL THERAPIST

## 2022-05-10 PROCEDURE — 97110 THERAPEUTIC EXERCISES: CPT | Performed by: PHYSICAL THERAPIST

## 2022-05-10 NOTE — PROGRESS NOTES
Physical Therapy Daily Progress Note    VISIT#: 2    Subjective   Connie Mc reports: Realized she was having a bad reaction to her covid booster shot during her session last time and that was why she was feeling so bad. Still having symptoms when lying flat and rolling over, as well as leaning her head back to drink a glass of water.       Objective     See Exercise, Manual, and Modality Logs for complete treatment.   Positive for crystal movement in horizontal canals and (L) posterior canal   Patient Education: continue CRT for one more week to determine need to see MD     Assessment/Plan  Pt with improved symptoms after session, however discussed seeing another therapist next session d/t minimal changes in symptoms after the last two sessions. Will assess tolerance and reaction to this third attempt today, and pt will see another vestibular therapist in this clinic next session for a second opinion. Pt in agreement.     Progress per Plan of Care        Timed:         Manual Therapy:         mins  12667;     Therapeutic Exercise:     10    mins  93703;     Neuromuscular Bridget:        mins  65957;    Therapeutic Activity:         mins  33470;     Gait Training:           mins  71819;     Ultrasound:          mins  73889;    Ionto                                   mins   26202  Self Care                            mins   95442  Canalith Repos               18    mins  14610    Un-Timed:  Electrical Stimulation:         mins  65236 ( );  Traction          mins 70752  Dry Needle                 ______ mins DRYNDL  Low Eval          Mins  40719  Mod Eval          Mins  06633  High Eval                            Mins  40777  Re-Eval                               mins  46795    Timed Treatment:   28   mins   Total Treatment:     55   mins    Yeimy Green, PT    Physical Therapist

## 2022-08-19 ENCOUNTER — DOCUMENTATION (OUTPATIENT)
Dept: PHYSICAL THERAPY | Facility: CLINIC | Age: 82
End: 2022-08-19

## 2022-08-19 NOTE — PROGRESS NOTES
Discharge Summary  Discharge Summary from Physical Therapy Report      Dates  PT visit: 22-5/10/22  Number of Visits: 2     Goals: All Met    Discharge Plan: Continue with current home exercise program as instructed    Comments : Pt did not call to schedule any further sessions regarding treatment, and per last note she was to call if her dizziness persisted. POC has now , therefore DC is appropriate at this time. Pt not present for discharge, therefore no functional measures taken. See last note for most updated information.      Date of Discharge 22         Yeimy Green, PT  Physical Therapist

## 2022-12-13 ENCOUNTER — TRANSCRIBE ORDERS (OUTPATIENT)
Dept: PHYSICAL THERAPY | Facility: CLINIC | Age: 82
End: 2022-12-13

## 2022-12-13 DIAGNOSIS — H81.10 BENIGN PAROXYSMAL VERTIGO, UNSPECIFIED LATERALITY: Primary | ICD-10-CM

## 2022-12-19 ENCOUNTER — TREATMENT (OUTPATIENT)
Dept: PHYSICAL THERAPY | Facility: CLINIC | Age: 82
End: 2022-12-19

## 2022-12-19 DIAGNOSIS — R42 VERTIGO: ICD-10-CM

## 2022-12-19 DIAGNOSIS — H81.13 BPPV (BENIGN PAROXYSMAL POSITIONAL VERTIGO), BILATERAL: Primary | ICD-10-CM

## 2022-12-19 DIAGNOSIS — M43.6 STIFF NECK: ICD-10-CM

## 2022-12-19 DIAGNOSIS — H81.10 BENIGN PAROXYSMAL VERTIGO, UNSPECIFIED LATERALITY: ICD-10-CM

## 2022-12-19 PROCEDURE — 97161 PT EVAL LOW COMPLEX 20 MIN: CPT | Performed by: PHYSICAL THERAPIST

## 2022-12-19 PROCEDURE — 95992 CANALITH REPOSITIONING PROC: CPT | Performed by: PHYSICAL THERAPIST

## 2022-12-19 NOTE — PROGRESS NOTES
Calling about the stat VQ order.   Physical Therapy Initial Evaluation and Plan of Care  [facility]  [address], [address]  [place] [address]    Patient: Connie Mc   : 1940  Diagnosis/ICD-10 Code:  BPPV (benign paroxysmal positional vertigo), bilateral [H81.13]  Referring practitioner: SONJA Mota  Date of Initial Visit: 2022  Today's Date: 2022  Patient seen for 1 sessions           Subjective Questionnaire: DHI: 20% limited      Subjective Evaluation    History of Present Illness  Mechanism of injury: Pt with long hx of on/off vertigo. Pt has been treated in this clinic in the past with good relief. Pt arrives today to initiate OPPT for BPPV. Pt has taken Meclizine in the past, but not currently as she feels like sometimes the effects of the meds are worse than the issue.     Pt does not report any pain, but does note some nausea this time which is not typical with her prior bouts of BPPV.     PMH: allergic, arthritis, Wilcox's esophagus, pre-DM, HTN, RLS, multi thyroid nodule, GERD, hyperlipidemia, functional bowel disease, hypoestrinism, anxiety, back pain with L sided radiculopathy, DJD multi sites, insomnia (controlled), hx vertigo/BPPV    PSH: breast surgery x3 steratatic biopsies, cholecystectomy, hyste, knee surg, shld surg L     Dizziness: 0/10 current, 0/10 at best, 5/10 at worst    Aggravating/functional factors: turning over in bed, getting in/out of bed, standing up from bed, turning/moving head quickly, looking up    PLOF: long hx of vertigo/BPPV on/off through the years    Relieving factors: focusing on something in the distance may help     Social Hx: lives with spouse; no stairs; retired - has 1.4 y/o great grandchild      Quality of life: good    Patient Goals  Patient goals for therapy: independence with ADLs/IADLs, return to sport/leisure activities, increased motion and improved balance  Patient goal: decrease dizziness, return to PLOF           Objective      Ambulation     Comments   VESTIBULAR:  Vertigo / hypofunction     VOMS:      Baseline symptoms- 0/10           Smooth pursuit - 0/10           Saccades vertical - 0/10           Saccades horizontal 0/10           Convergence (near point) 0/10           VOR - horizontal 0/10           VOR - vertical 0/10             [facility] SOP:     Condition 1 - N     Condition 2 -S      Condition 3 -N     Condition 4 -S     Condition 5 -S     Condition 6 -F      Vertebral AA test: negative Bilat      Nystagmus not present with saccades during VOMS testing      Nystagmus present with SL hallpike test B (R>L)    Observation: stiff neck most limited with SB then rots        Assessment & Plan     Assessment  Impairments: activity intolerance, impaired balance, lacks appropriate home exercise program and safety issue    Assessment details: The patient is a 81 y.o. female who presents to physical therapy today for vertigo and B BPPV. Upon initial evaluation, the patient demonstrates the following impairments: dizziness, reduced cervical mobility, mildly reduced balance and limited function. Due to these impairments, the patient is unable to/limited with: turning over in bed, getting in/out of bed, standing up from bed, turning/moving head quickly, and looking up. DHI is 20% limited. The patient would benefit from skilled PT services to address functional limitations and impairments and to improve patient quality of life.        Barriers to therapy: arthritis, pre DMs, HTN, GERD, anxiety, back pain/L sided radiculopathy & DJD could all affect PT Rx/progress/outcomes if exacerbated/unregulated which could affect tolerance to PT/exs   Prognosis: good    Goals  Plan Goals: Goals  Plan Goals: STG: to be met within 6 visits   1. Pt to be (I) with initial HEP  2. 50% decrease in dizziness and spinning sensations   3. Less than 3 days a week with dizziness upon sitting up     LTG: to be met by DC   1. Pt to be (I) with finalized HEP  2. Pt to  report decreased impairment per DHI to less than 10%   3. Resume normal positioning and rolling in bed with no issues with dizziness   4. Return to normal ADLs/activities with great grandchild without fear of becoming dizzy     Plan  Therapy options: will be seen for skilled therapy services  Planned therapy interventions: manual therapy, gait training, neuromuscular re-education, postural training, balance/weight-bearing training, ADL retraining, strengthening, transfer training, therapeutic activities, home exercise program, motor coordination training, soft tissue mobilization, stretching, functional ROM exercises, spinal/joint mobilization and flexibility  Other planned therapy interventions: canlith repositioning, reiki/massage  Frequency: 2x week  Duration in weeks: 13  Treatment plan discussed with: patient  Plan details: Patient in agreement.        History # of Personal Factors and/or Comorbidities: HIGH (3+)  Examination of Body System(s): # of elements: HIGH (4+)  Clinical Presentation: STABLE   Clinical Decision Making: LOW       Timed:         Manual Therapy:         mins  72062;     Therapeutic Exercise:         mins  54617;     Neuromuscular Bridget:        mins  28080;    Therapeutic Activity:          mins  33890;     Gait Training:           mins  64966;     Ultrasound:          mins  01176;    Ionto                                   mins   94462  Self Care                            mins   18445      Un-Timed:  Electrical Stimulation:         mins  00397 ( );  Canalith Repos   10      mins 65902  Dry Needling           mins self-pay  Traction          mins 85253  Low Eval     22     Mins  97168  Mod Eval          Mins  69060  High Eval                            Mins  38497  Re-Eval                               mins  83301        Timed Treatment:   0   mins   Total Treatment:     32   mins    PT SIGNATURE: Yakelin Andujar PT   [place] Lic# [id number]  DATE TREATMENT INITIATED:  12/19/2022    Medicare Initial Certification  Certification Period: 12/19/20228911GUXPZIJ4/18/2023  I certify that the therapy services are furnished while this patient is under my care.  The services outlined above are required by this patient, and will be reviewed every 90 days.     PHYSICIAN: Shruthi Meza APRN      DATE:     Please sign and return via fax to [phone]. Thank you, [facility].

## 2023-01-11 ENCOUNTER — TREATMENT (OUTPATIENT)
Dept: PHYSICAL THERAPY | Facility: CLINIC | Age: 83
End: 2023-01-11
Payer: MEDICARE

## 2023-01-11 DIAGNOSIS — M43.6 STIFF NECK: ICD-10-CM

## 2023-01-11 DIAGNOSIS — R42 VERTIGO: ICD-10-CM

## 2023-01-11 DIAGNOSIS — H81.13 BPPV (BENIGN PAROXYSMAL POSITIONAL VERTIGO), BILATERAL: Primary | ICD-10-CM

## 2023-01-11 PROCEDURE — 95992 CANALITH REPOSITIONING PROC: CPT | Performed by: PHYSICAL THERAPIST

## 2023-01-11 NOTE — PROGRESS NOTES
Physical Therapy Treatment Note  [facility]  [address], [address]  [place] [address]      VISIT#: 2    Subjective   Connie Mc reports: she was better after the first visit, but did feel it coming on very slightly a couple of times after that. Then on Monday she got up and was up for about 4 mins when the vertigo hit and was so bad that she had to grab onto the counter or she would have fallen. Pt states all she did was go to the kitchen and get the coffee started, then went in to the bathroom. Pt said it was so bad that she made her way back to the bed and just laid there heaving for a long time.    Pt is frustrated because she had to miss her bi-weekly family outing because of it. Pt states it usually happens more in the early morning or later at night, but did not happen with initial rolling out of bed this time. Pt states this time it was about as severe as the first time it ever happened about 20 yrs ago when she thought she was having a stroke. Pt has been taking her Meclizine since then and she doesn't feel like the room has spun since then.       Objective     S/L Jonike (+) B    See Exercise, Manual, and Modality Logs for complete treatment.     Patient Education: discussed anatomy & how BPPV happens and why maneuvers are the way to treat the issue    Assessment/Plan   Pt tolerated session fairly well as Meclizine appears to have dampened the severity of the symptoms. Pt with relief of symptoms post Rx. Pt will call to schedule prn.     Progress per Plan of Care and Progress strengthening /stabilization /functional activity            Timed:         Manual Therapy:         mins  69737;     Therapeutic Exercise:         mins  78913;     Neuromuscular Bridget:        mins  35925;    Therapeutic Activity:          mins  64664;     Gait Training:           mins  13277;     Ultrasound:          mins  93297;    Ionto                                   mins   90884  Self Care                             mins   77483    Un-Timed:  Electrical Stimulation:         mins  12826 ( );  Traction          mins 52244  Canalith Repos             23      mins  91925  Dry Needle 1-2 ms      ___  mins 94556  Dry Needle  3+ ms              mins 94832  Low Eval          mins  70340  Mod Eval          Mins  53851  High Eval                            Mins  72033  Re-Eval                               mins  33539    Timed Treatment:   23   mins   Total Treatment:      23  mins    Yakelin Andujar, PT    Physical Therapist

## 2023-03-30 ENCOUNTER — DOCUMENTATION (OUTPATIENT)
Dept: PHYSICAL THERAPY | Facility: CLINIC | Age: 83
End: 2023-03-30
Payer: MEDICARE

## 2023-03-30 NOTE — PROGRESS NOTES
Discharge Summary  Discharge Summary from Physical Therapy Report      Dates  PT visit: 22-23  Number of Visits: 2     Discharge Status of Patient: pt was seen for 2 sessions and never returned. POC has  and pt is therefore discharged at this time.     Goals: Goal status is undetermined as pt never returned to PT after 23.     Discharge Plan: No specific D/C instructions were given as pt never returned to PT after 23.    Comments: pt had good relief after maneuvers    Date of Discharge: 3/30/23        Yakelin Andujar, PT  Physical Therapist